# Patient Record
Sex: FEMALE | Race: WHITE
[De-identification: names, ages, dates, MRNs, and addresses within clinical notes are randomized per-mention and may not be internally consistent; named-entity substitution may affect disease eponyms.]

---

## 2018-01-17 ENCOUNTER — HOSPITAL ENCOUNTER (EMERGENCY)
Dept: HOSPITAL 25 - ED | Age: 63
Discharge: HOME | End: 2018-01-17
Payer: COMMERCIAL

## 2018-01-17 VITALS — DIASTOLIC BLOOD PRESSURE: 65 MMHG | SYSTOLIC BLOOD PRESSURE: 96 MMHG

## 2018-01-17 DIAGNOSIS — Z88.2: ICD-10-CM

## 2018-01-17 DIAGNOSIS — R07.9: ICD-10-CM

## 2018-01-17 DIAGNOSIS — R10.13: Primary | ICD-10-CM

## 2018-01-17 DIAGNOSIS — Z87.891: ICD-10-CM

## 2018-01-17 LAB
BASOPHILS # BLD AUTO: 0 10^3/UL (ref 0–0.2)
EOSINOPHIL # BLD AUTO: 0 10^3/UL (ref 0–0.6)
HCT VFR BLD AUTO: 40 % (ref 35–47)
HGB BLD-MCNC: 13.8 G/DL (ref 12–16)
INR PPP/BLD: 0.9 (ref 0.77–1.02)
LYMPHOCYTES # BLD AUTO: 0.3 10^3/UL (ref 1–4.8)
MCH RBC QN AUTO: 34 PG (ref 27–31)
MCHC RBC AUTO-ENTMCNC: 35 G/DL (ref 31–36)
MCV RBC AUTO: 97 FL (ref 80–97)
MONOCYTES # BLD AUTO: 0.2 10^3/UL (ref 0–0.8)
NEUTROPHILS # BLD AUTO: 6.3 10^3/UL (ref 1.5–7.7)
NRBC # BLD AUTO: 0 10^3/UL
NRBC BLD QL AUTO: 0
PLATELET # BLD AUTO: 272 10^3/UL (ref 150–450)
RBC # BLD AUTO: 4.12 10^6/UL (ref 4–5.4)
WBC # BLD AUTO: 6.8 10^3/UL (ref 3.5–10.8)

## 2018-01-17 PROCEDURE — 85610 PROTHROMBIN TIME: CPT

## 2018-01-17 PROCEDURE — 96374 THER/PROPH/DIAG INJ IV PUSH: CPT

## 2018-01-17 PROCEDURE — 85025 COMPLETE CBC W/AUTO DIFF WBC: CPT

## 2018-01-17 PROCEDURE — 36415 COLL VENOUS BLD VENIPUNCTURE: CPT

## 2018-01-17 PROCEDURE — 71045 X-RAY EXAM CHEST 1 VIEW: CPT

## 2018-01-17 PROCEDURE — 84443 ASSAY THYROID STIM HORMONE: CPT

## 2018-01-17 PROCEDURE — 93005 ELECTROCARDIOGRAM TRACING: CPT

## 2018-01-17 PROCEDURE — 85379 FIBRIN DEGRADATION QUANT: CPT

## 2018-01-17 PROCEDURE — 99284 EMERGENCY DEPT VISIT MOD MDM: CPT

## 2018-01-17 PROCEDURE — 84484 ASSAY OF TROPONIN QUANT: CPT

## 2018-01-17 PROCEDURE — 83605 ASSAY OF LACTIC ACID: CPT

## 2018-01-17 PROCEDURE — 80053 COMPREHEN METABOLIC PANEL: CPT

## 2018-01-17 NOTE — RAD
INDICATION: Chest pain



COMPARISON: None.

 

TECHNIQUE: Single AP portable view of the chest was obtained.



FINDINGS: 



Image quality is compromised due to the relative inferiority of a portable chest x-ray.



The heart and mediastinum exhibit normal size and contour.



The lungs are grossly clear. There is no evidence of a large pleural effusion.



Visualized bones are normal for the patient's age.



IMPRESSION:  No radiographic evidence for acute cardiopulmonary abnormality on this

portable chest x-ray.

## 2018-01-18 NOTE — ED
Felix CABRERA Thomas, scribed for Phil Stanley MD on 01/17/18 at 1634 .





HPI Chest Pain





- HPI Summary


HPI Summary: 


This patient is a 62 year old F BIBA to [facility] with a chief complaint of chest 

pain since this morning. The chest pain radiates to the back and she reports 

tingling in both hands. The patient rates the pain 3/10 in severity. Symptoms 

improved after the patient was given  and oxygen and nitroglycerin in 

the ambulance. Patient reports vomiting, nausea, diaphoresis, difficulty 

standing, SOB and reports that it felt like my throat had closed up. The 

patient has experienced episodes of similar pain in the past. PSHx includes 

cholecystectomy. 





- History of Current Complaint


Chief Complaint: EDChestPainROMI


Time Seen by Provider: 01/17/18 16:18


Hx Obtained From: Patient


Onset/Duration: Started Hours Ago, Still Present


Timing: Constant, Lasting Hours


Current Severity: Mild


Pain Intensity: 3


Pain Scale Used: 0-10 Numeric


Chest Pain Radiates: Yes


Chest Pain Radiates To:: Back


Aggravating Factor(s): Nothing


Alleviating Factor(s): Other: - Symptoms alleviated by nitroglycerin. The 

patient was also given  and oxygen in the ambulance.


Associated Signs and Symptoms: Positive: Other: - Patient reports vomiting, 

nausea, diaphoresis, difficulty standing, SOB and reports that it felt like my 

throat had closed up. The patient has experienced episodes of similar pain in 

the past.





- Allergy/Home Medications


Allergies/Adverse Reactions: 


 Allergies











Allergy/AdvReac Type Severity Reaction Status Date / Time


 


Sulfa Antibiotics Allergy  Itching Verified 01/17/18 16:17














PMH/Surg Hx/FS Hx/Imm Hx


GI History: Reports: Hx Gall Bladder Disease


EENT History: Reports: Other - meniere's disease





- Surgical History


Surgery Procedure, Year, and Place: cholecystectomy


Infectious Disease History: No


Infectious Disease History: 


   Denies: Traveled Outside the [place] in Last 30 Days





- Family History


Known Family History: Positive: Hypertension, Other - Heart arrhythmia, 

Prostate cancer





- Social History


Alcohol Use: Occasionally


Substance Use Type: Reports: None


Smoking Status (MU): Former Smoker





Review of Systems


Positive: Skin Diaphoresis.  Negative: Fever


Positive: Chest Pain


Positive: Shortness Of Breath


Positive: Vomiting, Nausea


Positive: Other - difficulty standing 


All Other Systems Reviewed And Are Negative: Yes





Physical Exam





- Summary


Physical Exam Summary: 


Appearance: The patient is well-nourished in no acute distress and in no acute 

pain.


 


Skin: The skin is warm and dry and skin color reflects adequate perfusion.





HEENT:  The head is normocephalic and atraumatic. The pupils are equal and 

reactive. The conjunctivae are clear and without drainage.  Nares are patent 

and without drainage.  Mouth reveals moist mucous membranes and the throat is 

without erythema and exudate.  The external ears are intact. The ear canals are 

patent and without drainage. The tympanic membranes are intact.


 


Neck: the neck is supple with full range of motion and non-tender. There are no 

carotid bruits.  There is no neck vein distension.


 


Respiratory: Chest is non-tender.  Lungs are clear to auscultation and breath 

sounds are symmetrical and equal.


 


Cardiovascular: Heart is regular rate and rhythm.  There is no murmur or rub 

auscultated.   There is no peripheral edema and pulses are symmetrical and 

equal.


 


Abdomen: The abdomen is soft and there is tenderness in the epigastrium.  There 

are normal bowel sounds heard in all four quadrants and there is no 

organomegaly palpated.


 


Musculoskeletal: There is no back tenderness noted.  Extremities are non-tender 

with full range of motion.  There is good capillary refill.  There is no 

peripheral edema or calf tenderness elicited.





Neurological: Patient is alert and oriented to person, place and time.  The 

patient has symmetrical motor strength in all four extremities.  Cranial nerves 

are grossly intact. Deep tendon reflexes are symmetrical and equal in all four 

extremities.


 


Psychiatric: The patient has an appropriate affect and does not exhibit any 

anxiety or depression.


Triage Information Reviewed: Yes


Vital Signs On Initial Exam: 


 Initial Vitals











Temp Pulse Resp BP Pulse Ox


 


 97.5 F   62   20   122/83   99 


 


 01/17/18 16:12  01/17/18 16:12  01/17/18 16:12  01/17/18 16:12  01/17/18 16:12











Vital Signs Reviewed: Yes





- [place] Coma Scale


Coma Scale Total: 15





Diagnostics





- Vital Signs


 Vital Signs











  Temp Pulse Resp BP Pulse Ox


 


 01/17/18 16:12  97.5 F  62  20  122/83  99














- Laboratory


Lab Results: 


 Lab Results











  01/17/18 01/17/18 01/17/18 Range/Units





  16:54 16:54 16:54 


 


WBC  6.8    (3.5-10.8)  10^3/ul


 


RBC  4.12    (4.0-5.4)  10^6/ul


 


Hgb  13.8    (12.0-16.0)  g/dl


 


Hct  40    (35-47)  %


 


MCV  97    (80-97)  fL


 


MCH  34 H    (27-31)  pg


 


MCHC  35    (31-36)  g/dl


 


RDW  13    (10.5-15)  %


 


Plt Count  272    (150-450)  10^3/ul


 


MPV  8    (7.4-10.4)  um3


 


Neut % (Auto)  91.9 H    (38-83)  %


 


Lymph % (Auto)  5.1 L    (25-47)  %


 


Mono % (Auto)  2.7    (1-9)  %


 


Eos % (Auto)  0    (0-6)  %


 


Baso % (Auto)  0.3    (0-2)  %


 


Absolute Neuts (auto)  6.3    (1.5-7.7)  10^3/ul


 


Absolute Lymphs (auto)  0.3 L    (1.0-4.8)  10^3/ul


 


Absolute Monos (auto)  0.2    (0-0.8)  10^3/ul


 


Absolute Eos (auto)  0    (0-0.6)  10^3/ul


 


Absolute Basos (auto)  0    (0-0.2)  10^3/ul


 


Absolute Nucleated RBC  0    10^3/ul


 


Nucleated RBC %  0    


 


INR (Anticoag Therapy)    0.90  (0.77-1.02)  


 


D-Dimer, Quantitative    218  (Less Than 230)  ng/mL


 


Sodium   137   (133-145)  mmol/L


 


Potassium   3.6   (3.5-5.0)  mmol/L


 


Chloride   103   (101-111)  mmol/L


 


Carbon Dioxide   25   (22-32)  mmol/L


 


Anion Gap   9   (2-11)  mmol/L


 


BUN   15   (6-24)  mg/dL


 


Creatinine   0.76   (0.51-0.95)  mg/dL


 


Est GFR ( Amer)   98.8   (>60)  


 


Est GFR (Non-Af Amer)   76.9   (>60)  


 


BUN/Creatinine Ratio   19.7   (8-20)  


 


Glucose   95   ()  mg/dL


 


Lactic Acid     (0.5-2.0)  mmol/L


 


Calcium   8.8   (8.6-10.3)  mg/dL


 


Total Bilirubin   1.00   (0.2-1.0)  mg/dL


 


AST   74 H   (13-39)  U/L


 


ALT   37   (7-52)  U/L


 


Alkaline Phosphatase   79   ()  U/L


 


Troponin I   0.00   (<0.04)  ng/mL


 


Total Protein   6.4   (6.4-8.9)  g/dL


 


Albumin   3.7   (3.2-5.2)  g/dL


 


Globulin   2.7   (2-4)  g/dL


 


Albumin/Globulin Ratio   1.4   (1-3)  


 


TSH   0.83   (0.34-5.60)  mcIU/mL














  01/17/18 01/17/18 Range/Units





  16:54 19:22 


 


WBC    (3.5-10.8)  10^3/ul


 


RBC    (4.0-5.4)  10^6/ul


 


Hgb    (12.0-16.0)  g/dl


 


Hct    (35-47)  %


 


MCV    (80-97)  fL


 


MCH    (27-31)  pg


 


MCHC    (31-36)  g/dl


 


RDW    (10.5-15)  %


 


Plt Count    (150-450)  10^3/ul


 


MPV    (7.4-10.4)  um3


 


Neut % (Auto)    (38-83)  %


 


Lymph % (Auto)    (25-47)  %


 


Mono % (Auto)    (1-9)  %


 


Eos % (Auto)    (0-6)  %


 


Baso % (Auto)    (0-2)  %


 


Absolute Neuts (auto)    (1.5-7.7)  10^3/ul


 


Absolute Lymphs (auto)    (1.0-4.8)  10^3/ul


 


Absolute Monos (auto)    (0-0.8)  10^3/ul


 


Absolute Eos (auto)    (0-0.6)  10^3/ul


 


Absolute Basos (auto)    (0-0.2)  10^3/ul


 


Absolute Nucleated RBC    10^3/ul


 


Nucleated RBC %    


 


INR (Anticoag Therapy)    (0.77-1.02)  


 


D-Dimer, Quantitative    (Less Than 230)  ng/mL


 


Sodium    (133-145)  mmol/L


 


Potassium    (3.5-5.0)  mmol/L


 


Chloride    (101-111)  mmol/L


 


Carbon Dioxide    (22-32)  mmol/L


 


Anion Gap    (2-11)  mmol/L


 


BUN    (6-24)  mg/dL


 


Creatinine    (0.51-0.95)  mg/dL


 


Est GFR ( Amer)    (>60)  


 


Est GFR (Non-Af Amer)    (>60)  


 


BUN/Creatinine Ratio    (8-20)  


 


Glucose    ()  mg/dL


 


Lactic Acid  0.8   (0.5-2.0)  mmol/L


 


Calcium    (8.6-10.3)  mg/dL


 


Total Bilirubin    (0.2-1.0)  mg/dL


 


AST    (13-39)  U/L


 


ALT    (7-52)  U/L


 


Alkaline Phosphatase    ()  U/L


 


Troponin I   0.01  (<0.04)  ng/mL


 


Total Protein    (6.4-8.9)  g/dL


 


Albumin    (3.2-5.2)  g/dL


 


Globulin    (2-4)  g/dL


 


Albumin/Globulin Ratio    (1-3)  


 


TSH    (0.34-5.60)  mcIU/mL











Result Diagrams: 


 01/17/18 16:54





 01/17/18 16:54


Lab Statement: Any lab studies that have been ordered have been reviewed, and 

results considered in the medical decision making process.





- Radiology


  ** CXR


Xray Interpretation: Positive (See Comments) - No radiographic evidence for 

acute cardiopulmonary abnormality on this portable chest x-ray.  ED physician 

has reviewed this radiology report and agrees.


Radiology Interpretation Completed By: Radiologist





- EKG


  ** 1643


Cardiac Rate: NL


EKG Rhythm: Sinus Rhythm





Chest Pain Course/Dx





- Course


Course Of Treatment: Ms. Joe presented with epigastric and chest pain that she 

has had many times in the past.  Today it was worse going clear up to her 

throat and making her feel as though she couldn't breathe.  She has been 

diagnosed with GERD and esophageal spasm in the past.  She improved in the EMS 

but still had some discomfort.  She had tenderness in the epigastrium.  I felt 

that this was a GI problem and treated her accordingly with improvement.  She 

did have labs including a delayed troponin which were negative.  She has PRN 

prilosec at home and I will give her a short course of sulcrafate.





- Diagnoses


Provider Diagnoses: 


 Epigastric pain








Discharge





- Discharge Plan


Condition: Stable


Disposition: HOME


Prescriptions: 


Sucralfate TAB* [Carafate*] 1 gm PO QID #40 tab


Patient Education Materials:  Epigastric Pain (ED)


Referrals: 


[facility] PHYSICIAN REFERRAL [Outside] - 3 Days


Additional Instructions: 


Call the [facility] physician referral to find a primary care physician and make an 

appointment. 





Return to the emergency department for any new or worsening symptoms. 





The documentation as recorded by the Felix mendez Thomas accurately reflects 

the service I personally performed and the decisions made by me, Phil Stanley MD.